# Patient Record
Sex: FEMALE | Race: WHITE | NOT HISPANIC OR LATINO | Employment: STUDENT | URBAN - METROPOLITAN AREA
[De-identification: names, ages, dates, MRNs, and addresses within clinical notes are randomized per-mention and may not be internally consistent; named-entity substitution may affect disease eponyms.]

---

## 2023-04-14 ENCOUNTER — CLINICAL SUPPORT (OUTPATIENT)
Dept: PEDIATRICS | Facility: CLINIC | Age: 18
End: 2023-04-14
Payer: COMMERCIAL

## 2023-04-14 DIAGNOSIS — Z23 NEED FOR HEPATITIS VACCINATION: ICD-10-CM

## 2023-04-14 PROCEDURE — 90471 IMMUNIZATION ADMIN: CPT | Performed by: PEDIATRICS

## 2023-04-14 PROCEDURE — 90746 HEPB VACCINE 3 DOSE ADULT IM: CPT | Performed by: PEDIATRICS

## 2023-04-14 RX ORDER — CLOBETASOL PROPIONATE 0.5 MG/G
CREAM TOPICAL
COMMUNITY
Start: 2022-06-07 | End: 2023-11-29 | Stop reason: ALTCHOICE

## 2023-04-14 RX ORDER — FERROUS SULFATE 325(65) MG
1 TABLET, DELAYED RELEASE (ENTERIC COATED) ORAL DAILY
COMMUNITY
Start: 2021-09-23 | End: 2023-11-29 | Stop reason: ALTCHOICE

## 2023-04-14 RX ORDER — NORETHINDRONE ACETATE AND ETHINYL ESTRADIOL AND FERROUS FUMARATE 1MG-20(21)
1 KIT ORAL DAILY
COMMUNITY
End: 2023-11-27 | Stop reason: SDUPTHER

## 2023-04-14 NOTE — PROGRESS NOTES
HBV was given in L deltoid. No reaction was noted prior to patient leaving office.    New vaccination record was provided as well with updated immunization. VIS was also provided.    Linda Camejo MA

## 2023-04-17 ENCOUNTER — APPOINTMENT (OUTPATIENT)
Dept: PEDIATRICS | Facility: CLINIC | Age: 18
End: 2023-04-17
Payer: COMMERCIAL

## 2023-05-15 ENCOUNTER — TELEPHONE (OUTPATIENT)
Dept: PEDIATRICS | Facility: CLINIC | Age: 18
End: 2023-05-15
Payer: COMMERCIAL

## 2023-05-16 DIAGNOSIS — Z01.84 IMMUNITY STATUS TESTING: Primary | ICD-10-CM

## 2023-05-16 PROBLEM — K20.0 EOSINOPHILIC ESOPHAGITIS: Status: ACTIVE | Noted: 2023-05-16

## 2023-05-16 LAB — NON-UH HIE HEPATITIS B SURFACE ANTIBODY: REACTIVE

## 2023-06-26 ENCOUNTER — CLINICAL SUPPORT (OUTPATIENT)
Dept: PEDIATRICS | Facility: CLINIC | Age: 18
End: 2023-06-26
Payer: COMMERCIAL

## 2023-06-26 DIAGNOSIS — Z11.1 SCREENING FOR TUBERCULOSIS: ICD-10-CM

## 2023-06-26 PROCEDURE — 86580 TB INTRADERMAL TEST: CPT | Performed by: PEDIATRICS

## 2023-06-26 RX ORDER — OMEPRAZOLE 40 MG/1
CAPSULE, DELAYED RELEASE ORAL
COMMUNITY
End: 2023-11-29

## 2023-06-26 RX ORDER — ONDANSETRON 4 MG/1
TABLET, ORALLY DISINTEGRATING ORAL EVERY 8 HOURS
COMMUNITY
Start: 2019-11-06 | End: 2023-11-29 | Stop reason: ALTCHOICE

## 2023-06-26 NOTE — PROGRESS NOTES
Patient received TB skin test placement today. Patient was instructed to return in 48-72 hours for test to be read.     PPD was placed on left forearm, no adverse reaction was noted prior to patient leaving office today.    Linda Camejo MA

## 2023-06-28 ENCOUNTER — CLINICAL SUPPORT (OUTPATIENT)
Dept: PEDIATRICS | Facility: CLINIC | Age: 18
End: 2023-06-28
Payer: COMMERCIAL

## 2023-06-28 LAB
INDURATION: NORMAL MM
TB SKIN TEST: NEGATIVE

## 2023-06-28 NOTE — PROGRESS NOTES
PPD read on Left forearm  NEG results  Printed results and AVS was provided for patient.    Linda Camejo MA

## 2023-07-05 ENCOUNTER — APPOINTMENT (OUTPATIENT)
Dept: PEDIATRICS | Facility: CLINIC | Age: 18
End: 2023-07-05
Payer: COMMERCIAL

## 2023-09-29 ENCOUNTER — CLINICAL SUPPORT (OUTPATIENT)
Dept: PEDIATRICS | Facility: CLINIC | Age: 18
End: 2023-09-29
Payer: COMMERCIAL

## 2023-09-29 DIAGNOSIS — Z09 NEED FOR IMMUNIZATION FOLLOW-UP: ICD-10-CM

## 2023-09-29 PROCEDURE — 90471 IMMUNIZATION ADMIN: CPT | Performed by: PEDIATRICS

## 2023-09-29 PROCEDURE — 90686 IIV4 VACC NO PRSV 0.5 ML IM: CPT | Performed by: PEDIATRICS

## 2023-11-27 ENCOUNTER — OFFICE VISIT (OUTPATIENT)
Dept: PEDIATRICS | Facility: CLINIC | Age: 18
End: 2023-11-27
Payer: COMMERCIAL

## 2023-11-27 VITALS — TEMPERATURE: 97.6 F | WEIGHT: 111.2 LBS | BODY MASS INDEX: 20.34 KG/M2

## 2023-11-27 DIAGNOSIS — L08.9 SKIN PUSTULE: Primary | ICD-10-CM

## 2023-11-27 DIAGNOSIS — N94.6 DYSMENORRHEA IN ADOLESCENT: ICD-10-CM

## 2023-11-27 PROCEDURE — 99213 OFFICE O/P EST LOW 20 MIN: CPT | Performed by: PEDIATRICS

## 2023-11-27 PROCEDURE — 1036F TOBACCO NON-USER: CPT | Performed by: PEDIATRICS

## 2023-11-27 RX ORDER — NORETHINDRONE ACETATE AND ETHINYL ESTRADIOL AND FERROUS FUMARATE 1MG-20(21)
1 KIT ORAL DAILY
Qty: 28 TABLET | Refills: 0 | Status: SHIPPED | OUTPATIENT
Start: 2023-11-27 | End: 2023-11-29 | Stop reason: SDUPTHER

## 2023-11-27 NOTE — PROGRESS NOTES
Patient is accompanied by and history provided by  self    They report symptoms of  right ear pain for 2 d, no cold symp, no fever , ran out of ocp, missed dose last night and needs refill    Exposure to illness  none      Physical exam  General: Vital signs reviewed, alert, no acute distress  Skin: rash none  Eyes:  without redness, drainage, or eyelid swelling  Ears: Right TM: normal color and  landmarks , right canal with pea sized pustule without a head at the 4oclock position  Left TM: normal color and  landmarks   Nose:  no congestion  without drainage  Throat: no lesion, tonsils  2-3+  without erythema, no exudate  Neck: Supple, no swollen nodes  Lungs: clear to auscultation  CV: RR, no murmur       Pustule in ear canal- hot compresses, topical acne cream    One month ocp sent to pharmacy until she can get in for her well appt

## 2023-11-29 ENCOUNTER — OFFICE VISIT (OUTPATIENT)
Dept: PEDIATRICS | Facility: CLINIC | Age: 18
End: 2023-11-29
Payer: COMMERCIAL

## 2023-11-29 VITALS
DIASTOLIC BLOOD PRESSURE: 75 MMHG | WEIGHT: 111.4 LBS | SYSTOLIC BLOOD PRESSURE: 114 MMHG | BODY MASS INDEX: 20.5 KG/M2 | HEIGHT: 62 IN | HEART RATE: 78 BPM

## 2023-11-29 DIAGNOSIS — Z13.31 DEPRESSION SCREEN: ICD-10-CM

## 2023-11-29 DIAGNOSIS — Z00.129 ENCOUNTER FOR ROUTINE CHILD HEALTH EXAMINATION WITHOUT ABNORMAL FINDINGS: Primary | ICD-10-CM

## 2023-11-29 DIAGNOSIS — N94.6 DYSMENORRHEA IN ADOLESCENT: ICD-10-CM

## 2023-11-29 DIAGNOSIS — Z01.10 ENCOUNTER FOR HEARING EXAMINATION WITHOUT ABNORMAL FINDINGS: ICD-10-CM

## 2023-11-29 PROBLEM — D22.5 MELANOCYTIC NEVI OF TRUNK: Status: RESOLVED | Noted: 2017-11-21 | Resolved: 2023-11-29

## 2023-11-29 PROCEDURE — 99395 PREV VISIT EST AGE 18-39: CPT | Performed by: PEDIATRICS

## 2023-11-29 PROCEDURE — 92551 PURE TONE HEARING TEST AIR: CPT | Performed by: PEDIATRICS

## 2023-11-29 PROCEDURE — 1036F TOBACCO NON-USER: CPT | Performed by: PEDIATRICS

## 2023-11-29 PROCEDURE — 3008F BODY MASS INDEX DOCD: CPT | Performed by: PEDIATRICS

## 2023-11-29 PROCEDURE — 96127 BRIEF EMOTIONAL/BEHAV ASSMT: CPT | Performed by: PEDIATRICS

## 2023-11-29 RX ORDER — NORETHINDRONE ACETATE AND ETHINYL ESTRADIOL AND FERROUS FUMARATE 1MG-20(21)
1 KIT ORAL DAILY
Qty: 28 TABLET | Refills: 11 | Status: SHIPPED | OUTPATIENT
Start: 2023-11-29

## 2023-11-29 NOTE — PATIENT INSTRUCTIONS
Here today for health maintenance visit. Immunizations up-to-date. Vision done by eye doctor. Hearing done. We will see back in one year for next health maintenance visit or sooner if needed.  Refilled OCP. Discussed seeing gyn when turn age 19  Please call your GI to discuss stopping your omeprazole.

## 2023-11-29 NOTE — PROGRESS NOTES
Subjective   Kilo is a 18 y.o. female who presents today for her Health Maintenance and Supervision Exam.    General Health:  Kilo is in good health. Prev hx of EoE but stopped her omeprazole couple months ago. Feels fine. Last scope 6/2022.  Concerns today: none    Social and Family History:  At home, family moved [place]  Parental support, work/family balance? Yes    Nutrition:  Current diet: grabs as she can. Not much fruit and veggies    Vitamins?supplements? Probiotic, MVI      Dental Care:  Kilo has a dental home: Yes  Dental hygiene regularly performed: Yes  Fluoridate water: Yes    Elimination:  Elimination patterns appropriate: Yes  Sleep:  Sleep patterns appropriate: Yes  Sleep problems: No    Behavior/Socialization:  Good relationships with parents and siblings? Yes  Supportive adult relationship? Yes  Permitted to make decisions? Yes  Responsibilities and chores? Yes  Family Meals? Yes  Normal peer relationships? 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, starting clinicals  Any educational accommodations:  No  Academically well adjusted: Yes  Performing at grade level: yes  Socially well adjusted:  yes    Activities:  Physical Activity: yes  Limited screen/media use:   Extracurricular Activities/Hobbies/Interests: gym, reading      Menstrual Status:  Menarche at age :  Menses: regular, occ skipping around, last few days  Problems: No    Sexual History:  Dating: yes  Sexually Active: yes    Drugs:  Tobacco: No  Alcohol: No  Uses drugs: No  Mental Health:  Depression Screening: phqa  Thoughts of self harm/suicide: No    Risk Assessment:  Additional health risks: no  Safety Assessment:  Safety topics reviewed: yes  Seatbelt yes  Sunscreen off/on    Objective   Physical Exam  Gen: Patient is alert and in NAD.   HEENT: Head is NC/AT. PERRL. EOMI. No conjunctival injection present. Fundi are NL; no esotropia or exotropia. TMs are transparent with good  landmarks. Nasopharynx is without significant edema or rhinorrhea. Oropharynx is clear with MMM.   No tonsillar enlargement or exudates present. Good dentition.  Neck: supple; no lymphadenopathy or masses.  CV: RRR, NL S1/S2, no murmurs.    Resp: CTA bilaterally; no wheezes or rhonchi; work of breathing is NL.    Abdomen: soft, non-tender, non-distended; no HSM or masses; positive bowel sounds.   : NL female genitalia, Miguel stage 5.   Musculoskeletal: Spine is straight; extremities are warm and dry with full ROM.     Neuro: NL gait, muscle tone, strength, and DTRs.     Skin: No significant rashes or lesions.      Assessment/Plan   Healthy 18 y.o. female child.  1. Anticipatory guidance discussed. Gave handout on well child issues at this age.  2. Vision  by eye doctor and hearing screen done  3. Vaccines as per orders if needed  4. Follow-up visit in 1 year for next well child visit, or sooner as needed.   Refilled ocp, start with gyn at 19

## 2023-12-22 ENCOUNTER — OFFICE VISIT (OUTPATIENT)
Dept: PEDIATRICS | Facility: CLINIC | Age: 18
End: 2023-12-22
Payer: COMMERCIAL

## 2023-12-22 VITALS — WEIGHT: 116 LBS | TEMPERATURE: 98.3 F | BODY MASS INDEX: 21.56 KG/M2

## 2023-12-22 DIAGNOSIS — R59.9 ENLARGEMENT OF LYMPH NODE: Primary | ICD-10-CM

## 2023-12-22 PROCEDURE — 99213 OFFICE O/P EST LOW 20 MIN: CPT | Performed by: PEDIATRICS

## 2023-12-22 PROCEDURE — 1036F TOBACCO NON-USER: CPT | Performed by: PEDIATRICS

## 2023-12-22 PROCEDURE — 3008F BODY MASS INDEX DOCD: CPT | Performed by: PEDIATRICS

## 2023-12-22 NOTE — PROGRESS NOTES
Subjective   Patient ID: Kilo Archuleta is a 18 y.o. female who presents for Cyst.  Today she is accompanied by alone.     HPI  Noted lesion in groin 2d prev.    Painful to touch  Denies redness or drainage.    No fever,   No trauma to area.      Prior eczema.    No prior skin infection issues     ROS negative except what is noted in HPI    Objective   There were no vitals taken for this visit.  BSA: There is no height or weight on file to calculate BSA.  Growth percentiles: Facility age limit for growth %mars is 20 years. Facility age limit for growth %mars is 20 years.     Physical Exam  Alert nad  , recent shaving.  No folliculitis noted.  No vaginal discharge.  Non tender mobile LN in L inguinal chain,  no erythema not WTT    Assessment/Plan   19 yo with reactive LN  Sx care  Call if persists > 2 weeks, increasing in size, WTT, fever  If worsens will treat for adenitis.    Problem List Items Addressed This Visit    None

## 2023-12-22 NOTE — PATIENT INSTRUCTIONS
19 yo with reactive LN  Sx care  Call if persists > 2 weeks, increasing in size, WTT, fever  If worsens will treat for adenitis.

## 2024-01-11 ENCOUNTER — OFFICE VISIT (OUTPATIENT)
Dept: OBSTETRICS AND GYNECOLOGY | Facility: CLINIC | Age: 19
End: 2024-01-11
Payer: COMMERCIAL

## 2024-01-11 VITALS
WEIGHT: 116 LBS | BODY MASS INDEX: 21.9 KG/M2 | HEIGHT: 61 IN | SYSTOLIC BLOOD PRESSURE: 110 MMHG | DIASTOLIC BLOOD PRESSURE: 70 MMHG

## 2024-01-11 DIAGNOSIS — N89.8 VAGINAL IRRITATION: ICD-10-CM

## 2024-01-11 DIAGNOSIS — N92.6 MISSED MENSES: ICD-10-CM

## 2024-01-11 LAB — PREGNANCY TEST URINE, POC: NEGATIVE

## 2024-01-11 PROCEDURE — 3008F BODY MASS INDEX DOCD: CPT | Performed by: ADVANCED PRACTICE MIDWIFE

## 2024-01-11 PROCEDURE — 87800 DETECT AGNT MULT DNA DIREC: CPT

## 2024-01-11 PROCEDURE — 87205 SMEAR GRAM STAIN: CPT

## 2024-01-11 PROCEDURE — 99203 OFFICE O/P NEW LOW 30 MIN: CPT | Performed by: ADVANCED PRACTICE MIDWIFE

## 2024-01-11 PROCEDURE — 87661 TRICHOMONAS VAGINALIS AMPLIF: CPT

## 2024-01-11 PROCEDURE — 1036F TOBACCO NON-USER: CPT | Performed by: ADVANCED PRACTICE MIDWIFE

## 2024-01-11 PROCEDURE — 81025 URINE PREGNANCY TEST: CPT | Performed by: ADVANCED PRACTICE MIDWIFE

## 2024-01-11 NOTE — PROGRESS NOTES
"GYNECOLOGY PROGRESS NOTE        CC:  see below. No changes in hygiene products. She feels she has yeast infections a lot. She has itching and she then treats with an OTC. Has only been Dxd and treated 1 time for yeast. Agrees to STI testing.  Patient has been taking OCPS for about 1 year or so and she has not had a menses since Oct. She does not miss pills. She had stopped her menses before when she was working out and involved in other activities more. Her BMI now is 21.92.   Chief Complaint   Patient presents with    Pelvic Pain     Yeast infections, itching, irritation, discharge   UPT NEG today         HPI:  Kilo Archuleta is her with a complaints of vaginal discharge  with itching.   She denies any new sexual partners, recent antibiotics, or new soaps or detergents.  Prior STDs none, recurrent vaginitis she feels she had a yeast infection that occurs before her menses for months now.      ROS:  GYN - see HPI        PHYSICAL EXAM:  /70 (BP Location: Right arm, Patient Position: Sitting, BP Cuff Size: Adult)   Ht 1.549 m (5' 1\")   Wt 52.6 kg (116 lb)   LMP 10/25/2023   Breastfeeding No   BMI 21.92 kg/m²   GEN:  A&O, NAD  URO:  normal urethra, bladder NT  GYN:  normal vulva and perineum w/o lesions or ulcers,  no lesions, normal cervix without lesions or CMT, uterus NT/NE, adnexa mobile and NT/NE  LYMPH:  1 noted enlarged inguinal lymph node on the Left  PSYCH:  normal affect, non-anxious      IMPRESSION/PLAN:  A: Negative UPT. Lower BMI. No discharge noted with exam. Possible STI exposure. Enlarged but soft lymph noted in the groin area. No menses since Oct. No skipped pills but low BMI.   P: UPT negative. STI off the urine sent. Vaginal cx sent. If all cultures then patient may agree to have mycoplasma culture sent to rule out other infections. Pap after she turns 19.     Problem List Items Addressed This Visit    None  Visit Diagnoses       Vaginal irritation                 STD cultures done.  Vulvar " hygiene measures and condom use for STD prevention reviewed.       Cinthya Morales, APRN-CNM

## 2024-01-12 LAB
C TRACH RRNA SPEC QL NAA+PROBE: NEGATIVE
CLUE CELLS VAG LPF-#/AREA: NORMAL /[LPF]
N GONORRHOEA DNA SPEC QL PROBE+SIG AMP: NEGATIVE
NUGENT SCORE: 1
T VAGINALIS RRNA SPEC QL NAA+PROBE: NEGATIVE
YEAST VAG WET PREP-#/AREA: NORMAL

## 2024-05-08 ENCOUNTER — CLINICAL SUPPORT (OUTPATIENT)
Dept: PEDIATRICS | Facility: CLINIC | Age: 19
End: 2024-05-08
Payer: COMMERCIAL

## 2024-05-08 DIAGNOSIS — Z23 NEED FOR VACCINATION: ICD-10-CM

## 2024-05-08 DIAGNOSIS — Z11.1 PPD SCREENING TEST: ICD-10-CM

## 2024-05-08 PROCEDURE — 90715 TDAP VACCINE 7 YRS/> IM: CPT | Performed by: PEDIATRICS

## 2024-05-08 PROCEDURE — 86580 TB INTRADERMAL TEST: CPT | Performed by: PEDIATRICS

## 2024-05-08 PROCEDURE — 90471 IMMUNIZATION ADMIN: CPT | Performed by: PEDIATRICS

## 2024-05-10 ENCOUNTER — CLINICAL SUPPORT (OUTPATIENT)
Dept: PEDIATRICS | Facility: CLINIC | Age: 19
End: 2024-05-10
Payer: COMMERCIAL

## 2024-05-10 LAB
INDURATION: NORMAL MM
TB SKIN TEST: NEGATIVE

## 2024-10-21 ENCOUNTER — APPOINTMENT (OUTPATIENT)
Dept: PEDIATRICS | Facility: CLINIC | Age: 19
End: 2024-10-21
Payer: COMMERCIAL

## 2024-10-21 VITALS
TEMPERATURE: 98.1 F | HEIGHT: 62 IN | SYSTOLIC BLOOD PRESSURE: 120 MMHG | HEART RATE: 85 BPM | WEIGHT: 110.8 LBS | DIASTOLIC BLOOD PRESSURE: 75 MMHG | BODY MASS INDEX: 20.39 KG/M2

## 2024-10-21 DIAGNOSIS — Z00.121 ENCOUNTER FOR ROUTINE CHILD HEALTH EXAMINATION WITH ABNORMAL FINDINGS: Primary | ICD-10-CM

## 2024-10-21 DIAGNOSIS — F41.9 ANXIETY: ICD-10-CM

## 2024-10-21 DIAGNOSIS — F32.1 CURRENT MODERATE EPISODE OF MAJOR DEPRESSIVE DISORDER WITHOUT PRIOR EPISODE (MULTI): ICD-10-CM

## 2024-10-21 DIAGNOSIS — K59.00 CONSTIPATION, UNSPECIFIED CONSTIPATION TYPE: ICD-10-CM

## 2024-10-21 DIAGNOSIS — Z91.018 FOOD ALLERGY: ICD-10-CM

## 2024-10-21 DIAGNOSIS — Z01.10 ENCOUNTER FOR HEARING EXAMINATION WITHOUT ABNORMAL FINDINGS: ICD-10-CM

## 2024-10-21 PROBLEM — K20.0 EOSINOPHILIC ESOPHAGITIS: Status: RESOLVED | Noted: 2023-05-16 | Resolved: 2024-10-21

## 2024-10-21 PROCEDURE — 99213 OFFICE O/P EST LOW 20 MIN: CPT | Performed by: PEDIATRICS

## 2024-10-21 PROCEDURE — 96127 BRIEF EMOTIONAL/BEHAV ASSMT: CPT | Performed by: PEDIATRICS

## 2024-10-21 PROCEDURE — 99395 PREV VISIT EST AGE 18-39: CPT | Performed by: PEDIATRICS

## 2024-10-21 PROCEDURE — 3008F BODY MASS INDEX DOCD: CPT | Performed by: PEDIATRICS

## 2024-10-21 PROCEDURE — 1036F TOBACCO NON-USER: CPT | Performed by: PEDIATRICS

## 2024-10-21 RX ORDER — EPINEPHRINE 0.3 MG/.3ML
0.3 INJECTION SUBCUTANEOUS ONCE
Qty: 2 EACH | Refills: 1 | Status: SHIPPED | OUTPATIENT
Start: 2024-10-21 | End: 2024-10-21

## 2024-10-21 RX ORDER — ESCITALOPRAM OXALATE 10 MG/1
TABLET ORAL
Qty: 30 TABLET | Refills: 1 | Status: SHIPPED | OUTPATIENT
Start: 2024-10-21

## 2024-10-21 ASSESSMENT — PATIENT HEALTH QUESTIONNAIRE - PHQ9
2. FEELING DOWN, DEPRESSED OR HOPELESS: NEARLY EVERY DAY
5. POOR APPETITE OR OVEREATING: NEARLY EVERY DAY
8. MOVING OR SPEAKING SO SLOWLY THAT OTHER PEOPLE COULD HAVE NOTICED. OR THE OPPOSITE, BEING SO FIGETY OR RESTLESS THAT YOU HAVE BEEN MOVING AROUND A LOT MORE THAN USUAL: NOT AT ALL
SUM OF ALL RESPONSES TO PHQ9 QUESTIONS 1 & 2: 4
10. IF YOU CHECKED OFF ANY PROBLEMS, HOW DIFFICULT HAVE THESE PROBLEMS MADE IT FOR YOU TO DO YOUR WORK, TAKE CARE OF THINGS AT HOME, OR GET ALONG WITH OTHER PEOPLE: VERY DIFFICULT
2. FEELING DOWN, DEPRESSED OR HOPELESS: NEARLY EVERY DAY
10. IF YOU CHECKED OFF ANY PROBLEMS, HOW DIFFICULT HAVE THESE PROBLEMS MADE IT FOR YOU TO DO YOUR WORK, TAKE CARE OF THINGS AT HOME, OR GET ALONG WITH OTHER PEOPLE: VERY DIFFICULT
5. POOR APPETITE OR OVEREATING: NEARLY EVERY DAY
1. LITTLE INTEREST OR PLEASURE IN DOING THINGS: SEVERAL DAYS
4. FEELING TIRED OR HAVING LITTLE ENERGY: SEVERAL DAYS
SUM OF ALL RESPONSES TO PHQ QUESTIONS 1-9: 15
8. MOVING OR SPEAKING SO SLOWLY THAT OTHER PEOPLE COULD HAVE NOTICED. OR THE OPPOSITE - BEING SO FIDGETY OR RESTLESS THAT YOU HAVE BEEN MOVING AROUND A LOT MORE THAN USUAL: NOT AT ALL
4. FEELING TIRED OR HAVING LITTLE ENERGY: SEVERAL DAYS
9. THOUGHTS THAT YOU WOULD BE BETTER OFF DEAD, OR OF HURTING YOURSELF: NOT AT ALL
7. TROUBLE CONCENTRATING ON THINGS, SUCH AS READING THE NEWSPAPER OR WATCHING TELEVISION: SEVERAL DAYS
6. FEELING BAD ABOUT YOURSELF - OR THAT YOU ARE A FAILURE OR HAVE LET YOURSELF OR YOUR FAMILY DOWN: NEARLY EVERY DAY
3. TROUBLE FALLING OR STAYING ASLEEP OR SLEEPING TOO MUCH: NEARLY EVERY DAY
9. THOUGHTS THAT YOU WOULD BE BETTER OFF DEAD, OR OF HURTING YOURSELF: NOT AT ALL
1. LITTLE INTEREST OR PLEASURE IN DOING THINGS: SEVERAL DAYS
7. TROUBLE CONCENTRATING ON THINGS, SUCH AS READING THE NEWSPAPER OR WATCHING TELEVISION: SEVERAL DAYS
6. FEELING BAD ABOUT YOURSELF - OR THAT YOU ARE A FAILURE OR HAVE LET YOURSELF OR YOUR FAMILY DOWN: NEARLY EVERY DAY
3. TROUBLE FALLING OR STAYING ASLEEP: NEARLY EVERY DAY

## 2024-10-21 NOTE — PROGRESS NOTES
Subjective   Kilo is a 19 y.o. female who presents today with her  self for her Health Maintenance and Supervision Exam.    General Health:  Kilo is in good health  Concerns today: failed last 2 classes for lpn and is repetition for it.   Pistachios - drooling, hard to swallow n/v since then decreased appetite-not having bm daily goes 3 days   Social and Family History:  At home, [place]  Parental support, work/family balance? Yes    Nutrition:  Current diet: prev balanced now having poor appetite    Vitamins?supplements? mvi      Dental Care:  Kilo has a dental home: Yes  Dental hygiene regularly performed: Yes  Fluoridate water: Yes    Elimination:  Elimination patterns appropriate: no constipation  Sleep:  Sleep patterns appropriate: Yes  Sleep problems: No    Behavior/Socialization:  Good relationships with parents and siblings? Yes  Supportive adult relationship? Yes  Permitted to make decisions? Yes  Responsibilities and chores? Yes  Family Meals? Yes  Normal peer relationships? 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   HEENT: Head is NC/AT. PERRL. EOMI. No conjunctival injection present. Fundi are NL; no esotropia or exotropia. TMs are transparent with good landmarks. Nasopharynx is without significant edema or rhinorrhea. Oropharynx is clear with MMM.   No tonsillar enlargement or exudates present. Good  dentition.  Neck: supple; no lymphadenopathy or masses.  CV: RRR, NL S1/S2, no murmurs.    Resp: CTA bilaterally; no wheezes or rhonchi; work of breathing is NL.    Abdomen: soft, non-tender, non-distended; no HSM or masses; positive bowel sounds.   : NL female genitalia, Miguel stage 5.   Musculoskeletal: Spine is straight; extremities are warm and dry with full ROM.     Neuro: NL gait, muscle tone, strength, and DTRs.     Skin: No significant rashes or lesions.      Assessment/Plan   Healthy 19 y.o. female child.  1. Anticipatory guidance discussed. Gave handout on well child issues at this age.  2. Discussed transition to adult PCP at age 20.      Problem List Items Addressed This Visit       Constipation     Currently with constipation. Will do cleanout followed by daily miralax. If syptoms not improved with regualr stooling to call back         Anxiety     Patient here today for anxiety/depression.    PHQ-A/SCARED questionnaire(s) done and reviewed by me personally and discussed with patient. Positive depression screen and positive anxiety screen     Discussed possible treatment options including counseling (personal and family) alone, medication alone, and combination treatment  Patient willing to continue  behavioral therapy     Patient/parent  would like to proceed with medication . Discussed treatment options including SSRIs such as Prozac, Lexapro, or Zoloft.   SSRIs are typically first line medication in children/adolescents.   Will start on lexapro  Discussed how to take, potential side effects, box warning, etc. Discussed that these medications typically take 4-6 weeks to show signs of improvement.   Reviewed how important it is to never stop medication without consulting with a physician first.   Discussed expected course of treatment.   Discussed typical follow up schedule when on these medications. All questions from patient answered.   Discussed mobile crisis unit and suicide hotline information.  [phone]     Follow up in 1 month  Call sooner with any concerns/questions           Relevant Medications    escitalopram (Lexapro) 10 mg tablet    Current moderate episode of major depressive disorder without prior episode (Multi)     Patient here today for anxiety/depression.    PHQ-A/SCARED questionnaire(s) done and reviewed by me personally and discussed with patient. Positive depression screen and positive anxiety screen    Discussed possible treatment options including counseling (personal and family) alone, medication alone, and combination treatment  Patient willing to continue  behavioral therapy    Patient/parent  would like to proceed with medication . Discussed treatment options including SSRIs such as Prozac, Lexapro, or Zoloft.   SSRIs are typically first line medication in children/adolescents.   Will start on lexapro  Discussed how to take, potential side effects, box warning, etc. Discussed that these medications typically take 4-6 weeks to show signs of improvement.   Reviewed how important it is to never stop medication without consulting with a physician first.   Discussed expected course of treatment.   Discussed typical follow up schedule when on these medications. All questions from patient answered.   Discussed mobile crisis unit and suicide hotline information. [phone]    Follow up in 1 month  Call sooner with any concerns/questions           Relevant Medications    escitalopram (Lexapro) 10 mg tablet    Food allergy    Relevant Medications    EPINEPHrine (Epipen) 0.3 mg/0.3 mL injection syringe       Strict avoidance of all tree nuts until seen by allergist.  Staff Instructed on how to use epipen    Referral to Pediatric Allergy     Other Visit Diagnoses       Encounter for routine child health examination with abnormal findings    -  Primary    Relevant Orders    1 Year Follow Up In Pediatrics    Encounter for hearing examination without abnormal findings        Pediatric body  mass index (BMI) of 5th percentile to less than 85th percentile for age

## 2024-10-21 NOTE — ASSESSMENT & PLAN NOTE
Patient here today for anxiety/depression.    PHQ-A/SCARED questionnaire(s) done and reviewed by me personally and discussed with patient. Positive depression screen and positive anxiety screen    Discussed possible treatment options including counseling (personal and family) alone, medication alone, and combination treatment  Patient willing to continue  behavioral therapy    Patient/parent  would like to proceed with medication . Discussed treatment options including SSRIs such as Prozac, Lexapro, or Zoloft.   SSRIs are typically first line medication in children/adolescents.   Will start on lexapro  Discussed how to take, potential side effects, box warning, etc. Discussed that these medications typically take 4-6 weeks to show signs of improvement.   Reviewed how important it is to never stop medication without consulting with a physician first.   Discussed expected course of treatment.   Discussed typical follow up schedule when on these medications. All questions from patient answered.   Discussed mobile crisis unit and suicide hotline information. [phone]    Follow up in 1 month  Call sooner with any concerns/questions

## 2024-10-21 NOTE — ASSESSMENT & PLAN NOTE
Currently with constipation. Will do cleanout followed by daily miralax. If syptoms not improved with regualr stooling to call back

## 2024-10-21 NOTE — LETTER
To Whom It May Concern:    Kilo Archuleta,  2005, was seen in my office on 2024.  She has been evaluated and is now under my care for anxiety and depression.    If you have any further questions, please contact my office.    Sincerely,      Misha Dang M.D., F.A.A.P

## 2024-10-21 NOTE — ASSESSMENT & PLAN NOTE
Patient here today for anxiety/depression.    PHQ-A/SCARED questionnaire(s) done and reviewed by me personally and discussed with patient. Positive depression screen and positive anxiety screen     Discussed possible treatment options including counseling (personal and family) alone, medication alone, and combination treatment  Patient willing to continue  behavioral therapy     Patient/parent  would like to proceed with medication . Discussed treatment options including SSRIs such as Prozac, Lexapro, or Zoloft.   SSRIs are typically first line medication in children/adolescents.   Will start on lexapro  Discussed how to take, potential side effects, box warning, etc. Discussed that these medications typically take 4-6 weeks to show signs of improvement.   Reviewed how important it is to never stop medication without consulting with a physician first.   Discussed expected course of treatment.   Discussed typical follow up schedule when on these medications. All questions from patient answered.   Discussed mobile crisis unit and suicide hotline information. [phone]     Follow up in 1 month  Call sooner with any concerns/questions

## 2024-10-21 NOTE — PATIENT INSTRUCTIONS
We talked about cleaning out that stool with Miralax today. May mix in water, juice or gatorade. DO NOT MIX in MILK PRODUCTS. Start the cleanout on a weekend or when your child can be home near the bathroom. There are no food restrictions during a cleanout. Your child may experience cramping, this may be alleviated by telling the child to go to the bathroom.  Your child should pass a large amount of stool in 24 hrs and by end of day have loose, watery stools    > 10 years old:  14 capfuls = 238 gram container in 64 oz of clear liquid and drink in 2-4 hrs  ExLax  1 square 30 minutes before and after drinking miralax    Continue with 1 capful in 8 oz at bedtime daily.  Bathroom time after meals  Increase water intake in diet    Goal of maintenance Miralax is to produce a soft formed stool daily without pain or the presence of blood.   You may need to increase or decrease the daily dose or frequency  to keep the stools soft and regular, not diarrhea     Patient here today for anxiety/depression.    PHQ-A/SCARED questionnaire(s) done and reviewed by me personally and discussed with patient/family.      Discussed possible treatment options including counseling (personal and family) alone, medication alone, and combination treatment  Patient willing to continue  behavioral therapy    Patient/parent  would like to proceed with medication . Discussed treatment options including SSRIs such as Prozac, Lexapro, or Zoloft.   SSRIs are typically first line medication in children/adolescents.     Will start on lexapro  Discussed how to take, potential side effects, box warning, etc. Discussed that these medications typically take 4-6 weeks to show signs of improvement.   Reviewed how important it is to never stop medication without consulting with a physician first.   Discussed expected course of treatment.   Discussed typical follow up schedule when on these medications. All questions from patient/family answered.  Discussed  mobile crisis unit and suicide hotline information. [phone]    Follow up in 1 month  Call sooner with any concerns/questions

## 2024-11-13 DIAGNOSIS — F41.9 ANXIETY: ICD-10-CM

## 2024-11-13 DIAGNOSIS — N94.6 DYSMENORRHEA IN ADOLESCENT: ICD-10-CM

## 2024-11-13 DIAGNOSIS — F32.1 CURRENT MODERATE EPISODE OF MAJOR DEPRESSIVE DISORDER WITHOUT PRIOR EPISODE (MULTI): ICD-10-CM

## 2024-11-14 RX ORDER — ESCITALOPRAM OXALATE 10 MG/1
TABLET ORAL
Qty: 90 TABLET | Refills: 1 | Status: SHIPPED | OUTPATIENT
Start: 2024-11-14

## 2024-11-14 RX ORDER — NORETHINDRONE ACETATE AND ETHINYL ESTRADIOL AND FERROUS FUMARATE 1MG-20(21)
1 KIT ORAL DAILY
Qty: 28 TABLET | Refills: 11 | Status: SHIPPED | OUTPATIENT
Start: 2024-11-14

## 2024-11-18 DIAGNOSIS — N94.6 DYSMENORRHEA IN ADOLESCENT: ICD-10-CM

## 2024-11-18 RX ORDER — NORETHINDRONE ACETATE AND ETHINYL ESTRADIOL AND FERROUS FUMARATE 1MG-20(21)
1 KIT ORAL DAILY
Qty: 28 TABLET | Refills: 11 | Status: SHIPPED | OUTPATIENT
Start: 2024-11-18

## 2024-11-27 ENCOUNTER — APPOINTMENT (OUTPATIENT)
Dept: PEDIATRICS | Facility: CLINIC | Age: 19
End: 2024-11-27
Payer: COMMERCIAL

## 2025-05-18 DIAGNOSIS — F32.1 CURRENT MODERATE EPISODE OF MAJOR DEPRESSIVE DISORDER WITHOUT PRIOR EPISODE (MULTI): ICD-10-CM

## 2025-05-18 DIAGNOSIS — F41.9 ANXIETY: ICD-10-CM

## 2025-05-21 RX ORDER — ESCITALOPRAM OXALATE 10 MG/1
10 TABLET ORAL NIGHTLY
Qty: 90 TABLET | Refills: 1 | Status: SHIPPED | OUTPATIENT
Start: 2025-05-21

## 2025-07-22 ENCOUNTER — APPOINTMENT (OUTPATIENT)
Dept: PRIMARY CARE | Facility: CLINIC | Age: 20
End: 2025-07-22
Payer: COMMERCIAL

## 2025-07-22 VITALS
BODY MASS INDEX: 20.2 KG/M2 | WEIGHT: 107 LBS | HEIGHT: 61 IN | RESPIRATION RATE: 18 BRPM | SYSTOLIC BLOOD PRESSURE: 112 MMHG | DIASTOLIC BLOOD PRESSURE: 70 MMHG | HEART RATE: 76 BPM | TEMPERATURE: 97.2 F | OXYGEN SATURATION: 98 %

## 2025-07-22 DIAGNOSIS — K59.04 CHRONIC IDIOPATHIC CONSTIPATION: ICD-10-CM

## 2025-07-22 DIAGNOSIS — K20.0 EOSINOPHILIC ESOPHAGITIS: ICD-10-CM

## 2025-07-22 DIAGNOSIS — F32.5 MAJOR DEPRESSIVE DISORDER WITH SINGLE EPISODE, IN FULL REMISSION: ICD-10-CM

## 2025-07-22 DIAGNOSIS — F41.9 ANXIETY: Primary | ICD-10-CM

## 2025-07-22 DIAGNOSIS — N92.0 MENORRHAGIA WITH REGULAR CYCLE: ICD-10-CM

## 2025-07-22 PROBLEM — Z91.018 FOOD ALLERGY: Status: RESOLVED | Noted: 2024-10-21 | Resolved: 2025-07-22

## 2025-07-22 PROCEDURE — 3008F BODY MASS INDEX DOCD: CPT

## 2025-07-22 PROCEDURE — 1036F TOBACCO NON-USER: CPT

## 2025-07-22 PROCEDURE — 99204 OFFICE O/P NEW MOD 45 MIN: CPT

## 2025-07-22 RX ORDER — NORETHINDRONE ACETATE AND ETHINYL ESTRADIOL AND FERROUS FUMARATE 1MG-20(21)
1 KIT ORAL DAILY
Qty: 28 TABLET | Refills: 11 | Status: SHIPPED | OUTPATIENT
Start: 2025-07-22 | End: 2025-07-22

## 2025-07-22 RX ORDER — ESCITALOPRAM OXALATE 10 MG/1
10 TABLET ORAL NIGHTLY
Qty: 90 TABLET | Refills: 2 | Status: SHIPPED | OUTPATIENT
Start: 2025-07-22 | End: 2025-07-22

## 2025-07-22 RX ORDER — NORETHINDRONE ACETATE AND ETHINYL ESTRADIOL AND FERROUS FUMARATE 1MG-20(21)
1 KIT ORAL DAILY
Qty: 84 TABLET | Refills: 3 | Status: SHIPPED | OUTPATIENT
Start: 2025-07-22

## 2025-07-22 RX ORDER — ESCITALOPRAM OXALATE 10 MG/1
10 TABLET ORAL NIGHTLY
Qty: 90 TABLET | Refills: 3 | Status: SHIPPED | OUTPATIENT
Start: 2025-07-22

## 2025-07-22 ASSESSMENT — PATIENT HEALTH QUESTIONNAIRE - PHQ9
1. LITTLE INTEREST OR PLEASURE IN DOING THINGS: NOT AT ALL
SUM OF ALL RESPONSES TO PHQ9 QUESTIONS 1 AND 2: 0
2. FEELING DOWN, DEPRESSED OR HOPELESS: NOT AT ALL

## 2025-07-22 NOTE — PROGRESS NOTES
"Subjective   Kilo Archuleta is a 20 y.o. female   Patient presents to [facility] care.    She reports chronic constipation.  She only goes to the restroom every few days.  The stools are hard.  She thinks she might of tried MiraLAX a long time ago but not recently.  She is not currently taking anything for the constipation.    She needs refills on her Lexapro and OCP.  She feels like the Lexapro is working well for the anxiety and the depression.    - Specialists that pt follows with: gi  - Immunizations: Doesn't get covid or flu shots. 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  - Diet: Well balanced  - Exercise: Regularly  - Alcohol/tobacco/drugs: Occasional social alcohol use, no tobacco/nicotine/drugs  - Menstrual periods: Not much with OCP  - Contraception: OCP  - Mood: Good with lexapro     Active Problem List      Comprehensive Medical/Surgical/Social/Family History  Medical History[1]  Surgical History[2]  Social History     Social History Narrative    Not on file       Allergies and Medications  Clindamycin  Medications Ordered Prior to Encounter[3]    Objective   /70   Pulse 76   Temp 36.2 °C (97.2 °F)   Resp 18   Ht (!) 1.549 m (5' 1\")   Wt 48.5 kg (107 lb)   SpO2 98%   BMI 20.22 kg/m²    PHYSICAL EXAM  Gen: Well appearing, in NAD  Eyes: EOMI  HEENT: MMM. TMs normal. Throat normal.  Heart: RRR, no murmurs  Lungs: No increased work of breathing, CTAB, on RA  GI: Soft, NTND, no guarding or rebound  Extremities: WWP, cap refill <2sec, no pitting edema in LE b/l  Neuro: Alert, symmetrical facies, moves all extremities equally  Skin: No rashes or lesions  Psych: Appropriate mood and affect    Assessment/Plan     Follow-up " in October for annual physical and we will also update Pap smear and STD testing    Problem List Items Addressed This Visit       Eosinophilic esophagitis    Overview   This was found on EGD.  Used to be on omeprazole.  Patient denies symptoms.  She does cut food up into smaller bites though because of this.         Chronic idiopathic constipation    Overview   Recommend MiraLAX 1-2 capfuls daily with a goal of 1-2 soft bowel movements daily         Anxiety - Primary    Overview   Well-controlled on Lexapro 10 mg         Relevant Medications    escitalopram (Lexapro) 10 mg tablet    Other Relevant Orders    Follow Up In [facility] - PCP - Health Maintenance    Major depressive disorder with single episode, in full remission    Overview   Well-controlled on Lexapro 10 mg         Relevant Medications    escitalopram (Lexapro) 10 mg tablet    Menorrhagia with regular cycle    Overview   OCP helps with menorrhagia and dysmenorrhea          Relevant Medications    Junel FE 1/20, 28, 1 mg-20 mcg (21)/75 mg (7) tablet     Dayanna Grimm D.O.  Family Medicine Physician  [facility]  [address] [place] [address] [phone]    This note has been transcribed using Dragon voice recognition system and there is a possibility of unintentional typing misprints.          [1]   Past Medical History:  Diagnosis Date    Allergic     Anemia     Anxiety     Depression     Diarrhea, unspecified 10/22/2019    Bloody diarrhea    Eczema     Eosinophilic esophagitis 05/16/2023    Low back pain, unspecified 04/19/2016    Chronic bilateral low back pain without sciatica    Melanocytic nevi of trunk 11/21/2017    Nausea 12/04/2019    Nausea in pediatric patient    Other chest pain     Chest discomfort    Other fecal abnormalities 12/06/2019    Loose stools    Other specified conditions influencing health status(V49.89)     No significant past medical history    Personal history  of other diseases of the digestive system 01/17/2020    History of constipation    Personal history of other infectious and parasitic diseases 02/23/2020    History of Clostridioides difficile infection    Personal history of other infectious and parasitic diseases 02/12/2021    History of athlete's foot    Personal history of other infectious and parasitic diseases 12/06/2019    History of Clostridioides difficile colitis    Personal history of other specified conditions 02/23/2020    History of weight loss    Personal history of other specified conditions     History of diarrhea    Tinea pedis 09/17/2021    Tinea pedis of both feet   [2] History reviewed. No pertinent surgical history.  [3]   Current Outpatient Medications on File Prior to Visit   Medication Sig Dispense Refill    EPINEPHrine (Epipen) 0.3 mg/0.3 mL injection syringe Inject 0.3 mL (0.3 mg) into the muscle 1 time for 1 dose. use as directed for allergic reaction and then call 911 2 each 1    [DISCONTINUED] escitalopram (Lexapro) 10 mg tablet Take 1 tablet (10 mg) by mouth once daily at bedtime. 90 tablet 1    [DISCONTINUED] Junel FE 1/20, 28, 1 mg-20 mcg (21)/75 mg (7) tablet Take 1 tablet by mouth once daily. 28 tablet 11     No current facility-administered medications on file prior to visit.

## 2025-07-22 NOTE — PATIENT INSTRUCTIONS
Try daily miralax - you will have to adjust your dosing to a goal of having 1-2 soft, complete bowel movements daily  Start with 1 capful twice a day. You can then increase or decrease from there.

## 2025-10-24 ENCOUNTER — APPOINTMENT (OUTPATIENT)
Dept: PRIMARY CARE | Facility: CLINIC | Age: 20
End: 2025-10-24
Payer: COMMERCIAL